# Patient Record
(demographics unavailable — no encounter records)

---

## 2024-12-09 NOTE — REVIEW OF SYSTEMS
[Fever] : fever [Malaise] : malaise [Nasal Congestion] : nasal congestion [Sore Throat] : sore throat [Cough] : cough [Appetite Changes] : appetite changes [Rash] : rash [Negative] : Genitourinary

## 2024-12-11 NOTE — DISCUSSION/SUMMARY
[FreeTextEntry1] : # streptococcal pharyngitis - placed on a 10-day course of amoxicillin; prefers tablet BID # nasopharyngitis - supportive care advised: humidifier, hydration, honey if 1 year and older, clearing nose, positioning, antihistamine, etc.  - RVP will be sent out; informed that less likely medication would be effective, even flu turns positive, as it has been longer than 48 hours since the onset of fever - RTC for persistent high fever (longer than 3 days), breathing difficulty, poor oral intake, decreased UOP, or any new concerns/symptoms

## 2024-12-11 NOTE — PHYSICAL EXAM
[Clear Rhinorrhea] : clear rhinorrhea [Erythematous Oropharynx] : erythematous oropharynx [NL] : moves all extremities x4, warm, well perfused x4 [de-identified] : erythematous patches on lower abdominal wall

## 2024-12-11 NOTE — PHYSICAL EXAM
[Clear Rhinorrhea] : clear rhinorrhea [Erythematous Oropharynx] : erythematous oropharynx [NL] : moves all extremities x4, warm, well perfused x4 [de-identified] : erythematous patches on lower abdominal wall

## 2024-12-11 NOTE — HISTORY OF PRESENT ILLNESS
[de-identified] : sore throat [FreeTextEntry6] : - p/w sore throat, congestion, fever Tmax 105 for 4 days, a/w neck pain and skin rash - last night, cough worsened with yellow mucous, a/w post tussive emesis and [id number] - eating fine but has swallowing pain, drinking soup - reported that 2 years ago ear problems

## 2024-12-11 NOTE — HISTORY OF PRESENT ILLNESS
[de-identified] : sore throat [FreeTextEntry6] : - p/w sore throat, congestion, fever Tmax 105 for 4 days, a/w neck pain and skin rash - last night, cough worsened with yellow mucous, a/w post tussive emesis and [id number] - eating fine but has swallowing pain, drinking soup - reported that 2 years ago ear problems

## 2024-12-19 NOTE — HISTORY OF PRESENT ILLNESS
[de-identified] : fever and rash [FreeTextEntry6] : Finished antibiotic course for strep yesterday and developed a fever yesterday, 103.2 today. Rash on face (now subsided), also has had a stuffy nose for 2 weeks.  No V/D Mild cough from post nasal drip Traveling to [place] in 2 days

## 2024-12-19 NOTE — PHYSICAL EXAM
[Erythematous Oropharynx] : erythematous oropharynx [NL] : regular rate and rhythm, normal S1, S2 audible, no murmurs [de-identified] : erythena on cheeks medially aith some extending to center of cheek

## 2025-07-24 NOTE — PHYSICAL EXAM
[Alert] : alert [No Acute Distress] : no acute distress [Normocephalic] : normocephalic [Conjunctivae with no discharge] : conjunctivae with no discharge [PERRL] : PERRL [EOMI Bilateral] : EOMI bilateral [Auricles Well Formed] : auricles well formed [Clear Tympanic membranes with present light reflex and bony landmarks] : clear tympanic membranes with present light reflex and bony landmarks [No Discharge] : no discharge [Nares Patent] : nares patent [Pink Nasal Mucosa] : pink nasal mucosa [Palate Intact] : palate intact [Nonerythematous Oropharynx] : nonerythematous oropharynx [Supple, full passive range of motion] : supple, full passive range of motion [No Palpable Masses] : no palpable masses [Symmetric Chest Rise] : symmetric chest rise [Clear to Auscultation Bilaterally] : clear to auscultation bilaterally [Regular Rate and Rhythm] : regular rate and rhythm [Normal S1, S2 present] : normal S1, S2 present [No Murmurs] : no murmurs [+2 Femoral Pulses] : +2 femoral pulses [Soft] : soft [NonTender] : non tender [Non Distended] : non distended [Normoactive Bowel Sounds] : normoactive bowel sounds [No Hepatomegaly] : no hepatomegaly [No Splenomegaly] : no splenomegaly [Patent] : patent [No fissures] : no fissures [No Abnormal Lymph Nodes Palpated] : no abnormal lymph nodes palpated [No Gait Asymmetry] : no gait asymmetry [No pain or deformities with palpation of bone, muscles, joints] : no pain or deformities with palpation of bone, muscles, joints [Normal Muscle Tone] : normal muscle tone [Straight] : straight [+2 Patella DTR] : +2 patella DTR [Cranial Nerves Grossly Intact] : cranial nerves grossly intact [No Rash or Lesions] : no rash or lesions [Celestine: ____] : Celestine [unfilled] [Celestine: _____] : Celestine [unfilled]

## 2025-07-24 NOTE — DISCUSSION/SUMMARY
[No Feeding Concerns] : feeding [Normal Growth] : growth [Normal Development] : development  [No Elimination Concerns] : elimination [Continue Regimen] : feeding [No Skin Concerns] : skin [Normal Sleep Pattern] : sleep [None] : no medical problems [Anticipatory Guidance Given] : Anticipatory guidance addressed as per the history of present illness section [School] : school [Development and Mental Health] : development and mental health [Nutrition and Physical Activity] : nutrition and physical activity [Oral Health] : oral health [Safety] : safety [No Vaccines] : no vaccines needed [No Medications] : ~He/She~ is not on any medications [Patient] : patient [Parent/Guardian] : Parent/Guardian [Full Activity without restrictions including Physical Education & Athletics] : Full Activity without restrictions including Physical Education & Athletics [I have examined the above-named student and completed the preparticipation physical evaluation. The athlete does not present apparent clinical contraindications to practice and participate in sport(s) as outlined above. A copy of the physical exam is on r] : I have examined the above-named student and completed the preparticipation physical evaluation. The athlete does not present apparent clinical contraindications to practice and participate in sport(s) as outlined above. A copy of the physical exam is on record in my office and can be made available to the school at the request of the parents. If conditions arise after the athlete has been cleared for participation, the physician may rescind the clearance until the problem is resolved and the potential consequences are completely explained to the athlete (and parents/guardians). [] : The components of the vaccine(s) to be administered today are listed in the plan of care. The disease(s) for which the vaccine(s) are intended to prevent and the risks have been discussed with the caretaker.  The risks are also included in the appropriate vaccination information statements which have been provided to the patient's caregiver.  The caregiver has given consent to vaccinate. [FreeTextEntry1] : well 10yr old preteen, no pubertal changes on exam Tdap updated, will give MCV again next year AG discussed on puberty/growth/nutrition/habits

## 2025-07-24 NOTE — HISTORY OF PRESENT ILLNESS
[Mother] : mother [Fruit] : fruit [Grains] : grains [Eggs] : eggs [Vitamins] : takes vitamins  [Eats healthy meals and snacks] : eats healthy meals and snacks [Eats meals with family] : eats meals with family [Normal] : Normal [Brushing teeth twice/d] : brushing teeth twice per day [Yes] : Patient goes to dentist yearly [Toothpaste] : Primary Fluoride Source: Toothpaste [Premenarche] : premenarche [Playtime (60 min/d)] : playtime 60 min a day [Participates in after-school activities] : participates in after-school activities [< 2 hrs of screen time per day] : less than 2 hrs of screen time per day [Appropiate parent-child-sibling interaction] : appropriate parent-child-sibling interaction [Does chores when asked] : does chores when asked [Has Friends] : has friends [Has chance to make own decisions] : has chance to make own decisions [Adequate social interactions] : adequate social interactions [Adequate behavior] : adequate behavior [Adequate performance] : adequate performance [No difficulties with Homework] : no difficulties with homework [Appropriately restrained in motor vehicle] : appropriately restrained in motor vehicle [Supervised outdoor play] : supervised outdoor play [Parent knows child's friends] : parent knows child's friends [Family discusses home emergency plan] : family discusses home emergency plan [Monitored computer use] : monitored computer use [Up to date] : Up to date [NO] : No [In own bed] : In own bed [Exposure to alcohol] : no exposure to alcohol [Exposure to tobacco] : no exposure to tobacco [Exposure to electronic nicotine delivery system] : No exposure to electronic nicotine delivery system [Exposure to illicit drugs] : no exposure to illicit drugs [Supervised around water] : not supervised around water [Wears helmet and pads] : does not wear helmet and pads [Parent discusses safety rules regarding adults] : parent does not discuss safety rules regarding adults [FreeTextEntry7] : Risa is 10yr old now, in 5th grade this fall, just returned from sleep away camp with sisters, parents were empty housed first time this summer.  [de-identified] : none, had molluscum like lesions on back/butt area been over a year, also gymnastics she has to wear leotards and rubbing is common.  [de-identified] : mom was 12 or 13yr?  [FreeTextEntry1] : Ana is 10yr old now, finished w/ her recent sleep away camp, had a great time  now doing gymnastics camp for the rest of the year, no changes noted this summer in terms of pubertal development good student, mom does not have to tell her about it.